# Patient Record
Sex: MALE | Race: BLACK OR AFRICAN AMERICAN | HISPANIC OR LATINO | Employment: FULL TIME | URBAN - METROPOLITAN AREA
[De-identification: names, ages, dates, MRNs, and addresses within clinical notes are randomized per-mention and may not be internally consistent; named-entity substitution may affect disease eponyms.]

---

## 2023-01-10 ENCOUNTER — OFFICE VISIT (OUTPATIENT)
Dept: URGENT CARE | Facility: URGENT CARE | Age: 38
End: 2023-01-10
Payer: COMMERCIAL

## 2023-01-10 ENCOUNTER — ANCILLARY PROCEDURE (OUTPATIENT)
Dept: GENERAL RADIOLOGY | Facility: CLINIC | Age: 38
End: 2023-01-10
Attending: NURSE PRACTITIONER
Payer: COMMERCIAL

## 2023-01-10 VITALS
HEART RATE: 80 BPM | TEMPERATURE: 98.1 F | DIASTOLIC BLOOD PRESSURE: 84 MMHG | RESPIRATION RATE: 20 BRPM | WEIGHT: 217.4 LBS | SYSTOLIC BLOOD PRESSURE: 134 MMHG | OXYGEN SATURATION: 98 %

## 2023-01-10 DIAGNOSIS — S99.911A ANKLE INJURY, RIGHT, INITIAL ENCOUNTER: Primary | ICD-10-CM

## 2023-01-10 DIAGNOSIS — S99.911A ANKLE INJURY, RIGHT, INITIAL ENCOUNTER: ICD-10-CM

## 2023-01-10 PROCEDURE — 73610 X-RAY EXAM OF ANKLE: CPT | Mod: TC | Performed by: RADIOLOGY

## 2023-01-10 PROCEDURE — 99204 OFFICE O/P NEW MOD 45 MIN: CPT | Performed by: NURSE PRACTITIONER

## 2023-01-10 ASSESSMENT — PAIN SCALES - GENERAL: PAINLEVEL: SEVERE PAIN (6)

## 2023-01-10 NOTE — PROGRESS NOTES
S: Veronica Lackey is a 37 year old male who complains of inversion injury to the right ankle 2 days ago playing basketball, miss[place]. There is pain and swelling at the lateral aspect of that ankle. The patient is no able to bear weight directly after the injury.  Has been wearing a brace and using ice ibuprofen tylenol rest elevation    No past medical history on file.    No current outpatient medications on file.     No current facility-administered medications for this visit.      No Known Allergies    0:   /84   Pulse 80   Temp 98.1  F (36.7  C) (Tympanic)   Resp 20   Wt 98.6 kg (217 lb 6.4 oz)   SpO2 98%   He appears well  There is swelling and tenderness over the lateral malleolus. No tenderness over the medial aspect of the ankle. The fifth metatarsal is not tender. The ankle joint is intact without excessive opening on stressing.   X-Ray shows fracture to be absent, pending radiology read. The rest of the foot, ankle and leg exam is normal.    A: Sprain of ankle    P: Rest and elevate the injured ankle, apply ice intermittently. Declines crutches, given short walking boot   ACE bandage for at home   Continue OSMANY  Orthopedic referral placed if not improving follow up    ANDREINA Kim CNP